# Patient Record
(demographics unavailable — no encounter records)

---

## 2025-02-10 NOTE — PHYSICAL EXAM
[Chaperone Present] : A chaperone was present in the examining room during all aspects of the physical examination [[id number]] : A chaperone was present during the pelvic exam. [Appropriately responsive] : appropriately responsive [Alert] : alert [No Acute Distress] : no acute distress [Soft] : soft [Non-tender] : non-tender [Non-distended] : non-distended [Oriented x3] : oriented x3 [Examination Of The Breasts] : a normal appearance [No Masses] : no breast masses were palpable [Labia Majora] : normal [Labia Minora] : normal [Rectocele] : a rectocele [Discharge] : a  ~M vaginal discharge was present [Moderate] : moderate [White] : white [Thick] : thick [Normal] : normal [Enlarged ___ wks] : enlarged [unfilled] ~Uweeks [Uterine Adnexae] : normal [FreeTextEntry4] : pt reports difficulty holding in urine when bladder is full x years

## 2025-02-10 NOTE — HISTORY OF PRESENT ILLNESS
[TextBox_4] : 59yo presents for annual exam fibroid uterus no complaints  [Mammogramdate] : 2025 [PapSmeardate] : 2024 [BoneDensityDate] : not yet [ColonoscopyDate] : utd

## 2025-02-10 NOTE — DISCUSSION/SUMMARY
[FreeTextEntry1] : didnt do TVS last year for bloating - feels better urge incontinence -s tates for years - recommend urogyn consult, urine cx